# Patient Record
Sex: FEMALE | Race: WHITE | NOT HISPANIC OR LATINO | URBAN - METROPOLITAN AREA
[De-identification: names, ages, dates, MRNs, and addresses within clinical notes are randomized per-mention and may not be internally consistent; named-entity substitution may affect disease eponyms.]

---

## 2019-11-13 ENCOUNTER — DOCUMENTATION (OUTPATIENT)
Dept: AUDIOLOGY | Age: 84
End: 2019-11-13

## 2019-11-13 NOTE — PROGRESS NOTES
Progress Note    Name:  Roda Buerger  :  1932  Age:  80 y o  Date of Evaluation: 19       Scanned documents           Tete Chung   Clinical Audiologist

## 2020-02-28 NOTE — PROGRESS NOTES
Progress Note    Name:  Daniel Martin  :  1932  Age:  80 y o  Date of Evaluation: 20     Scanned in [facility] chart part 2         Tete Lowery , CCC-A  Clinical Audiologist